# Patient Record
Sex: FEMALE | Race: WHITE | Employment: FULL TIME | RURAL
[De-identification: names, ages, dates, MRNs, and addresses within clinical notes are randomized per-mention and may not be internally consistent; named-entity substitution may affect disease eponyms.]

---

## 2017-02-27 ENCOUNTER — OFFICE VISIT (OUTPATIENT)
Dept: FAMILY MEDICINE CLINIC | Age: 57
End: 2017-02-27

## 2017-02-27 VITALS
BODY MASS INDEX: 23.92 KG/M2 | RESPIRATION RATE: 18 BRPM | DIASTOLIC BLOOD PRESSURE: 72 MMHG | HEIGHT: 63 IN | TEMPERATURE: 97.1 F | HEART RATE: 58 BPM | OXYGEN SATURATION: 99 % | WEIGHT: 135 LBS | SYSTOLIC BLOOD PRESSURE: 111 MMHG

## 2017-02-27 DIAGNOSIS — Z86.69 HISTORY OF MIGRAINE HEADACHES: Primary | ICD-10-CM

## 2017-02-27 DIAGNOSIS — K58.0 IRRITABLE BOWEL SYNDROME WITH DIARRHEA: ICD-10-CM

## 2017-02-27 RX ORDER — SUMATRIPTAN 100 MG/1
100 TABLET, FILM COATED ORAL
Qty: 18 TAB | Refills: 1 | Status: SHIPPED | OUTPATIENT
Start: 2017-02-27 | End: 2018-08-07 | Stop reason: SDUPTHER

## 2017-02-27 NOTE — PROGRESS NOTES
Opal Man is a 55 y.o. female who presents with the following:  Chief Complaint   Patient presents with    Head Pain       Head Pain    The history is provided by the patient (Patient with a long history of migraine headaches has not been doing that well with Maxalt and would like to try sumatriptan to reduce the severity of her headaches). The headache is aggravated by photophobia. Associated symptoms include malaise/fatigue and nausea. Pertinent negatives include no near-syncope, no palpitations, no syncope, no shortness of breath, no weakness, no dizziness, no visual change and no vomiting. Irritable Bowel Syndrome   The history is provided by the patient (Patient has had a prolapsed rectum and ended up with a partial bowel resection and now has up to 11-12 bowel movements a day as her every time she eats she has to run to the bathroom including anything she drinks). Associated symptoms include abdominal pain. Pertinent negatives include no chest pain, no headaches and no shortness of breath. Allergies   Allergen Reactions    Levaquin [Levofloxacin] Hives    Penicillins Hives    Percocet [Oxycodone-Acetaminophen] Nausea Only       Current Outpatient Prescriptions   Medication Sig    SUMAtriptan (IMITREX) 100 mg tablet Take 1 Tab by mouth once as needed for Migraine for up to 1 dose. Indications: MIGRAINE    rizatriptan (MAXALT) 10 mg tablet TAKE 1 TABLET BY MOUTH ONCE AS NEEDED FOR MIGRAINE, MAY REPEAT IN 2 HOURS IF NEEDED    azithromycin (ZITHROMAX) 250 mg tablet Two tabs today, then 1 tab daily for 4 more days.  aspirin 81 mg chewable tablet Take 81 mg by mouth daily.  loratadine (CLARITIN) 10 mg tablet Take 10 mg by mouth as needed for Allergies. No current facility-administered medications for this visit.         Past Medical History:   Diagnosis Date    Ill-defined condition     MIGRANES       Past Surgical History:   Procedure Laterality Date    HX COLECTOMY      Partial w/ rectopexy for prolapse    HX GI  11/20/14    COLONOSCOPY    HX GYN      UTERINE POLYPS       Family History   Problem Relation Age of Onset    Hypertension Mother     Heart Disease Mother      A FIB    Cancer Father      LYMPH, PROSTATE    Cancer Maternal Grandmother      COLON    Cancer Paternal Grandfather      PANCREATIC    Anesth Problems Neg Hx        Social History     Social History    Marital status:      Spouse name: N/A    Number of children: N/A    Years of education: N/A     Social History Main Topics    Smoking status: Never Smoker    Smokeless tobacco: Never Used      Comment: SMOKED VERY BRIEFLY IN COLLEGE    Alcohol use 1.8 oz/week     3 Standard drinks or equivalent per week    Drug use: No    Sexual activity: Not Asked     Other Topics Concern    None     Social History Narrative       Review of Systems   Constitutional: Positive for malaise/fatigue. Respiratory: Negative for shortness of breath. Cardiovascular: Negative for chest pain, palpitations, syncope and near-syncope. Gastrointestinal: Positive for abdominal pain and nausea. Negative for vomiting. Neurological: Negative for dizziness, weakness and headaches. Visit Vitals    /72 (BP 1 Location: Left arm, BP Patient Position: Sitting)    Pulse (!) 58    Temp 97.1 °F (36.2 °C) (Oral)    Resp 18    Ht 5' 2.5\" (1.588 m)    Wt 135 lb (61.2 kg)    LMP 02/13/2017 (Within Days)    SpO2 99%    BMI 24.3 kg/m2     Physical Exam   Constitutional: She is oriented to person, place, and time and well-developed, well-nourished, and in no distress. HENT:   Head: Normocephalic and atraumatic. Right Ear: External ear normal.   Left Ear: External ear normal.   Mouth/Throat: Oropharynx is clear and moist.   Eyes: Conjunctivae and EOM are normal. Pupils are equal, round, and reactive to light. Right eye exhibits no discharge. Left eye exhibits no discharge. Neck: Normal range of motion. Neck supple.  No tracheal deviation present. No thyromegaly present. Cardiovascular: Normal rate, regular rhythm, normal heart sounds and intact distal pulses. Exam reveals no gallop and no friction rub. No murmur heard. Pulmonary/Chest: Effort normal and breath sounds normal. No respiratory distress. She has no wheezes. She exhibits no tenderness. Abdominal: Soft. Bowel sounds are normal. She exhibits no distension and no mass. There is no tenderness. There is no rebound and no guarding. Musculoskeletal: She exhibits no edema or tenderness. Lymphadenopathy:     She has no cervical adenopathy. Neurological: She is alert and oriented to person, place, and time. She has normal reflexes. No cranial nerve deficit. She exhibits normal muscle tone. Gait normal. Coordination normal.   Skin: Skin is warm and dry. No rash noted. No erythema. No pallor. Psychiatric: Mood, memory, affect and judgment normal.         ICD-10-CM ICD-9-CM    1. History of migraine headaches Z86.69 V12.49    2. Irritable bowel syndrome with diarrhea K58.0 564.1        Orders Placed This Encounter    SUMAtriptan (IMITREX) 100 mg tablet     Sig: Take 1 Tab by mouth once as needed for Migraine for up to 1 dose. Indications: MIGRAINE     Dispense:  18 Tab     Refill:  1    I asked the patient to give it another 2 or 3 months as it seems to be slowly improving. If this does not work I would be willing to try Imodium a.d. and very low dose and possibly cholestyramine.     Follow-up Disposition: Not on Janice Fitzgerald MD

## 2017-02-27 NOTE — MR AVS SNAPSHOT
Visit Information Date & Time Provider Department Dept. Phone Encounter #  
 2/27/2017  4:00 PM Yady Sheth MD [facility] [phone] [id number] Upcoming Health Maintenance Date Due Hepatitis C Screening 1960 DTaP/Tdap/Td series (1 - Tdap) 5/19/1982 PAP AKA CERVICAL CYTOLOGY 5/19/1982 FOBT Q 1 YEAR AGE 50-75 5/19/2011 INFLUENZA AGE 9 TO ADULT 8/1/2016 BREAST CANCER SCRN MAMMOGRAM 4/6/2018 Allergies as of 2/27/2017  Review Complete On: 2/27/2017 By: Yady Sheth MD  
  
 Severity Noted Reaction Type Reactions Levaquin [Levofloxacin] Medium 12/01/2014   Systemic Hives Penicillins  11/21/2014    Hives Percocet [Oxycodone-acetaminophen]  11/21/2014    Nausea Only Current Immunizations  Reviewed on 12/4/2014 No immunizations on file. Not reviewed this visit You Were Diagnosed With   
  
 Codes Comments History of migraine headaches    -  Primary ICD-10-CM: Z86.69 
ICD-9-CM: V12.49 Vitals BP  
  
  
  
  
  
 111/72 (BP 1 Location: Left arm, BP Patient Position: Sitting) Vitals History BMI and BSA Data Body Mass Index Body Surface Area  
 24.3 kg/m 2 1.64 m 2 Preferred Pharmacy Pharmacy Name Phone [id number] [address] [place] [phone] Your Updated Medication List  
  
   
This list is accurate as of: 2/27/17  4:59 PM.  Always use your most recent med list.  
  
  
  
  
 aspirin 81 mg chewable tablet Take 81 mg by mouth daily. azithromycin 250 mg tablet Commonly known as:  Charlotta Primrose Two tabs today, then 1 tab daily for 4 more days. CLARITIN 10 mg tablet Generic drug:  loratadine Take 10 mg by mouth as needed for Allergies. rizatriptan 10 mg tablet Commonly known as:  Alexi Gray TAKE 1 TABLET BY MOUTH ONCE AS NEEDED FOR MIGRAINE, MAY REPEAT IN 2 HOURS IF NEEDED  
  
  
  
  
 Introducing [facility]! Dear Page: Thank you for requesting a [employer] account. Our records indicate that you already have an active [employer] account. You can access your account anytime at [email or web address] [email or web address] Did you know that you can access your hospital and ER discharge instructions at any time in [employer]? You can also review all of your test results from your hospital stay or ER visit. Additional Information If you have questions, please visit the Frequently Asked Questions section of the [employer] website at [email or web address] Remember, [employer] is NOT to be used for urgent needs. For medical emergencies, dial 911. Now available from your iPhone and Android! Please provide this summary of care documentation to your next provider. Your primary care clinician is listed as Kathrin Nagel. If you have any questions after today's visit, please call [phone].

## 2017-03-23 ENCOUNTER — OFFICE VISIT (OUTPATIENT)
Dept: FAMILY MEDICINE CLINIC | Age: 57
End: 2017-03-23

## 2017-03-23 VITALS
BODY MASS INDEX: 23.92 KG/M2 | RESPIRATION RATE: 18 BRPM | SYSTOLIC BLOOD PRESSURE: 131 MMHG | HEIGHT: 63 IN | WEIGHT: 135 LBS | HEART RATE: 66 BPM | TEMPERATURE: 95.6 F | OXYGEN SATURATION: 100 % | DIASTOLIC BLOOD PRESSURE: 81 MMHG

## 2017-03-23 DIAGNOSIS — J34.81 ULCERATIVE NASAL MUCOSITIS: Primary | ICD-10-CM

## 2017-03-23 RX ORDER — DOXYCYCLINE 100 MG/1
100 CAPSULE ORAL 2 TIMES DAILY
Qty: 20 CAP | Refills: 0 | Status: SHIPPED | OUTPATIENT
Start: 2017-03-23 | End: 2017-04-02

## 2017-03-23 RX ORDER — HYDROCORTISONE VALERATE 2 MG/G
CREAM TOPICAL 2 TIMES DAILY
Qty: 15 G | Refills: 0 | Status: SHIPPED | OUTPATIENT
Start: 2017-03-23 | End: 2017-09-29 | Stop reason: ALTCHOICE

## 2017-03-23 NOTE — MR AVS SNAPSHOT
Visit Information Date & Time Provider Department Dept. Phone Encounter #  
 3/23/2017  7:00 AM Meagan Garcia MD [facility] [phone] [id number] Upcoming Health Maintenance Date Due Hepatitis C Screening 1960 DTaP/Tdap/Td series (1 - Tdap) 5/19/1982 PAP AKA CERVICAL CYTOLOGY 5/19/1982 FOBT Q 1 YEAR AGE 50-75 5/19/2011 INFLUENZA AGE 9 TO ADULT 8/1/2016 BREAST CANCER SCRN MAMMOGRAM 4/6/2018 Allergies as of 3/23/2017  Review Complete On: 2/27/2017 By: Meagan Garcia MD  
  
 Severity Noted Reaction Type Reactions Levaquin [Levofloxacin] Medium 12/01/2014   Systemic Hives Penicillins  11/21/2014    Hives Percocet [Oxycodone-acetaminophen]  11/21/2014    Nausea Only Current Immunizations  Reviewed on 12/4/2014 No immunizations on file. Not reviewed this visit Vitals BP Pulse Temp Resp Height(growth percentile) Weight(growth percentile) 131/81 (BP 1 Location: Left arm, BP Patient Position: Sitting) 66 95.6 °F (35.3 °C) 18 5' 2.5\" (1.588 m) 135 lb (61.2 kg) LMP SpO2 BMI OB Status Smoking Status 02/13/2017 (Within Days) 100% 24.3 kg/m2 Having regular periods Never Smoker Vitals History BMI and BSA Data Body Mass Index Body Surface Area  
 24.3 kg/m 2 1.64 m 2 Preferred Pharmacy Pharmacy Name Phone [id number] [address] [phone] Your Updated Medication List  
  
   
This list is accurate as of: 3/23/17  7:24 AM.  Always use your most recent med list.  
  
  
  
  
 aspirin 81 mg chewable tablet Take 81 mg by mouth daily. azithromycin 250 mg tablet Commonly known as:  Dayton Sleight Two tabs today, then 1 tab daily for 4 more days. CLARITIN 10 mg tablet Generic drug:  loratadine Take 10 mg by mouth as needed for Allergies. rizatriptan 10 mg tablet Commonly known as:  Areli Anand TAKE 1 TABLET BY MOUTH ONCE AS NEEDED FOR MIGRAINE, MAY REPEAT IN 2 HOURS IF NEEDED  
  
 SUMAtriptan 100 mg tablet Commonly known as:  IMITREX Take 1 Tab by mouth once as needed for Migraine for up to 1 dose. Indications: MIGRAINE Introducing [facility]! Dear Page: Thank you for requesting a [employer] account. Our records indicate that you already have an active [employer] account. You can access your account anytime at [email or web address] [email or web address] Did you know that you can access your hospital and ER discharge instructions at any time in [employer]? You can also review all of your test results from your hospital stay or ER visit. Additional Information If you have questions, please visit the Frequently Asked Questions section of the [employer] website at [email or web address] Remember, [employer] is NOT to be used for urgent needs. For medical emergencies, dial 911. Now available from your iPhone and Android! Please provide this summary of care documentation to your next provider. Your primary care clinician is listed as Edgardo Leaver. If you have any questions after today's visit, please call [phone].

## 2017-03-23 NOTE — PROGRESS NOTES
Jett Issa is a 55 y.o. female who presents with the following:  Chief Complaint   Patient presents with    Nasal Laceration     sores in nose       Nasal Laceration   The history is provided by the patient (Patient with history of recurrent bilateral ulcerations at the nares with a past history of exposure to probable MRSA from infections her  received in the hospital.  Patient has noted this is been improved by doxycycline in the past.). Pertinent negatives include no chest pain, no abdominal pain, no headaches and no shortness of breath. Allergies   Allergen Reactions    Levaquin [Levofloxacin] Hives    Penicillins Hives    Percocet [Oxycodone-Acetaminophen] Nausea Only       Current Outpatient Prescriptions   Medication Sig    doxycycline (VIBRAMYCIN) 100 mg capsule Take 1 Cap by mouth two (2) times a day for 10 days.  hydrocortisone valerate (WESTCORT) 0.2 % topical cream Apply  to affected area two (2) times a day. use thin layer    SUMAtriptan (IMITREX) 100 mg tablet Take 1 Tab by mouth once as needed for Migraine for up to 1 dose. Indications: MIGRAINE    rizatriptan (MAXALT) 10 mg tablet TAKE 1 TABLET BY MOUTH ONCE AS NEEDED FOR MIGRAINE, MAY REPEAT IN 2 HOURS IF NEEDED    aspirin 81 mg chewable tablet Take 81 mg by mouth daily.  loratadine (CLARITIN) 10 mg tablet Take 10 mg by mouth as needed for Allergies.  azithromycin (ZITHROMAX) 250 mg tablet Two tabs today, then 1 tab daily for 4 more days. No current facility-administered medications for this visit.         Past Medical History:   Diagnosis Date    Ill-defined condition     MIGRANES       Past Surgical History:   Procedure Laterality Date    HX COLECTOMY      Partial w/ rectopexy for prolapse    HX GI  11/20/14    COLONOSCOPY    HX GYN      UTERINE POLYPS       Family History   Problem Relation Age of Onset    Hypertension Mother     Heart Disease Mother      A FIB    Cancer Father      LYMPH, PROSTATE    Cancer Maternal Grandmother      COLON    Cancer Paternal Grandfather      PANCREATIC    Anesth Problems Neg Hx        Social History     Social History    Marital status:      Spouse name: N/A    Number of children: N/A    Years of education: N/A     Social History Main Topics    Smoking status: Never Smoker    Smokeless tobacco: Never Used      Comment: SMOKED VERY BRIEFLY IN [place]LE[place]    Alcohol use 1.8 oz/week     3 Standard drinks or equivalent per week    Drug use: No    Sexual activity: Not on file     Other Topics Concern    Not on file     Social History Narrative    No narrative on file       Review of Systems   Respiratory: Negative for shortness of breath. Cardiovascular: Negative for chest pain. Gastrointestinal: Negative for abdominal pain. Neurological: Negative for headaches. Visit Vitals    /81 (BP 1 Location: Left arm, BP Patient Position: Sitting)    Pulse 66    Temp 95.6 °F (35.3 °C)    Resp 18    Ht 5' 2.5\" (1.588 m)    Wt 135 lb (61.2 kg)    LMP 02/13/2017 (Within Days)    SpO2 100%    BMI 24.3 kg/m2     Physical Exam   Constitutional: She is oriented to person, place, and time and well-developed, well-nourished, and in no distress. HENT:   Head: Normocephalic and atraumatic. Right Ear: External ear normal.   Left Ear: External ear normal.   Mouth/Throat: Oropharynx is clear and moist.   At the nasal introitus on either side there is an ulcerated area surrounded by erythema which is quite tender and could be infected with either staph or strep   Eyes: Conjunctivae and EOM are normal. Pupils are equal, round, and reactive to light. Right eye exhibits no discharge. Left eye exhibits no discharge. Neck: Normal range of motion. Neck supple. No tracheal deviation present. No thyromegaly present. Cardiovascular: Normal rate, regular rhythm, normal heart sounds and intact distal pulses. Exam reveals no gallop and no friction rub.     No murmur heard.  Pulmonary/Chest: Effort normal and breath sounds normal. No respiratory distress. She has no wheezes. She exhibits no tenderness. Abdominal: Soft. Bowel sounds are normal. She exhibits no distension and no mass. There is no tenderness. There is no rebound and no guarding. Musculoskeletal: She exhibits no edema or tenderness. Lymphadenopathy:     She has no cervical adenopathy. Neurological: She is alert and oriented to person, place, and time. She has normal reflexes. No cranial nerve deficit. She exhibits normal muscle tone. Gait normal. Coordination normal. GCS score is 15. Skin: Skin is warm and dry. No rash noted. No erythema. No pallor. Psychiatric: Mood, memory, affect and judgment normal.         ICD-10-CM ICD-9-CM    1. Ulcerative nasal mucositis J34.81 478.11 doxycycline (VIBRAMYCIN) 100 mg capsule      hydrocortisone valerate (WESTCORT) 0.2 % topical cream     The patient is instructed on proper application of the hydrocortisone to relieve some of her stress and this condition can be recurrent and aggravated by very cold dry air which we have had lately. Patient has been instructed how to prevent cross contamination of her medications with good handwashing before application and avoiding touching the tubes of creams to obtain a bit of medicine. Orders Placed This Encounter    doxycycline (VIBRAMYCIN) 100 mg capsule     Sig: Take 1 Cap by mouth two (2) times a day for 10 days. Dispense:  20 Cap     Refill:  0    hydrocortisone valerate (WESTCORT) 0.2 % topical cream     Sig: Apply  to affected area two (2) times a day.  use thin layer     Dispense:  15 g     Refill:  0       Follow-up Disposition: Not on Savannah Stevens MD

## 2017-08-01 ENCOUNTER — OFFICE VISIT (OUTPATIENT)
Dept: FAMILY MEDICINE CLINIC | Age: 57
End: 2017-08-01

## 2017-08-01 VITALS
HEIGHT: 63 IN | WEIGHT: 130.6 LBS | HEART RATE: 66 BPM | OXYGEN SATURATION: 98 % | BODY MASS INDEX: 23.14 KG/M2 | SYSTOLIC BLOOD PRESSURE: 137 MMHG | TEMPERATURE: 96.1 F | DIASTOLIC BLOOD PRESSURE: 86 MMHG | RESPIRATION RATE: 17 BRPM

## 2017-08-01 DIAGNOSIS — H69.83 ETD (EUSTACHIAN TUBE DYSFUNCTION), BILATERAL: ICD-10-CM

## 2017-08-01 DIAGNOSIS — J31.0 NONALLERGIC RHINITIS: Primary | ICD-10-CM

## 2017-08-01 RX ORDER — PSEUDOEPHEDRINE HCL 120 MG/1
120 TABLET, FILM COATED, EXTENDED RELEASE ORAL
Qty: 20 TAB | Refills: 1 | Status: SHIPPED | OUTPATIENT
Start: 2017-08-01 | End: 2017-09-29 | Stop reason: ALTCHOICE

## 2017-08-01 NOTE — PROGRESS NOTES
Refugio Hurtado is a 65 y.o. female who presents with the following:  Chief Complaint   Patient presents with    Ear Pain       Ear Pain   The history is provided by the patient (Patient with bilateral ear pressure and eustachian tube dysfunction who has an air flight coming up and is somewhat concerned. ). Pertinent negatives include no chest pain, no abdominal pain, no headaches and no shortness of breath. Allergies   Allergen Reactions    Levaquin [Levofloxacin] Hives    Penicillins Hives    Percocet [Oxycodone-Acetaminophen] Nausea Only       Current Outpatient Prescriptions   Medication Sig    pseudoephedrine CR (SUDAFED 12 HOUR) 120 mg CR tablet Take 1 Tab by mouth two (2) times daily as needed for Congestion.  SUMAtriptan (IMITREX) 100 mg tablet Take 1 Tab by mouth once as needed for Migraine for up to 1 dose. Indications: MIGRAINE    aspirin 81 mg chewable tablet Take 81 mg by mouth daily.  loratadine (CLARITIN) 10 mg tablet Take 10 mg by mouth as needed for Allergies.  hydrocortisone valerate (WESTCORT) 0.2 % topical cream Apply  to affected area two (2) times a day. use thin layer    rizatriptan (MAXALT) 10 mg tablet TAKE 1 TABLET BY MOUTH ONCE AS NEEDED FOR MIGRAINE, MAY REPEAT IN 2 HOURS IF NEEDED    azithromycin (ZITHROMAX) 250 mg tablet Two tabs today, then 1 tab daily for 4 more days. No current facility-administered medications for this visit.         Past Medical History:   Diagnosis Date    Ill-defined condition     MIGRANES       Past Surgical History:   Procedure Laterality Date    HX COLECTOMY      Partial w/ rectopexy for prolapse    HX GI  11/20/14    COLONOSCOPY    HX GYN      UTERINE POLYPS       Family History   Problem Relation Age of Onset    Hypertension Mother     Heart Disease Mother      A FIB    Cancer Father      LYMPH, PROSTATE    Cancer Maternal Grandmother      COLON    Cancer Paternal Grandfather      PANCREATIC    Anesth Problems Neg Hx Social History     Social History    Marital status:      Spouse name: N/A    Number of children: N/A    Years of education: N/A     Social History Main Topics    Smoking status: Never Smoker    Smokeless tobacco: Never Used      Comment: SMOKED VERY BRIEFLY IN [place]    Alcohol use 1.8 oz/week     3 Standard drinks or equivalent per week    Drug use: No    Sexual activity: Not Asked     Other Topics Concern    None     Social History Narrative       Review of Systems   HENT: Positive for congestion. Respiratory: Negative for shortness of breath. Cardiovascular: Negative for chest pain. Gastrointestinal: Negative for abdominal pain. Neurological: Negative for headaches. Visit Vitals    /86 (BP 1 Location: Left arm, BP Patient Position: Sitting)    Pulse 66    Temp 96.1 °F (35.6 °C) (Oral)    Resp 17    Ht 5' 2.5\" (1.588 m)    Wt 130 lb 9.6 oz (59.2 kg)    SpO2 98%    BMI 23.51 kg/m2     Physical Exam   Constitutional: She is oriented to person, place, and time and well-developed, well-nourished, and in no distress. Has coryza that is clear - mild distress   HENT:   Head: Normocephalic and atraumatic. Right Ear: External ear normal.   Left Ear: External ear normal.   Mouth/Throat: Oropharynx is clear and moist. No oropharyngeal exudate. Mucous membranes in the nose and throat are normal however the TMs are slightly retracted however she is able to Valsalva them clear. Eyes: Conjunctivae and EOM are normal. Pupils are equal, round, and reactive to light. Right eye exhibits no discharge. Left eye exhibits no discharge. Neck: Normal range of motion. Neck supple. No tracheal deviation present. No thyromegaly present. Cardiovascular: Normal rate, regular rhythm, normal heart sounds and intact distal pulses. Exam reveals no gallop and no friction rub. No murmur heard. Pulmonary/Chest: Effort normal and breath sounds normal. No stridor.  No respiratory distress. She has no wheezes. She has no rales. She exhibits no tenderness. Abdominal: Soft. Bowel sounds are normal. She exhibits no distension and no mass. There is no tenderness. There is no rebound and no guarding. Musculoskeletal: She exhibits no edema or tenderness. Lymphadenopathy:     She has no cervical adenopathy. Neurological: She is alert and oriented to person, place, and time. She has normal reflexes. No cranial nerve deficit. She exhibits normal muscle tone. Gait normal. Coordination normal.   Skin: Skin is warm and dry. No rash noted. She is not diaphoretic. No erythema. No pallor. Psychiatric: Mood, memory, affect and judgment normal.         ICD-10-CM ICD-9-CM    1. Nonallergic rhinitis J31.0 472.0    2. ETD (eustachian tube dysfunction), bilateral H69.83 381.81        Orders Placed This Encounter    pseudoephedrine CR (SUDAFED 12 HOUR) 120 mg CR tablet     Sig: Take 1 Tab by mouth two (2) times daily as needed for Congestion. Dispense:  20 Tab     Refill:  1   Patient was told she could use Afrin as a backup should the pseudoephedrine not be good enough and I told her to start to Valsalva frequently when she arrives at the airport to catch her flight and then she should Valsalva while descending in the aircraft.     Follow-up Disposition: Not on Mark Salazar MD

## 2017-08-01 NOTE — MR AVS SNAPSHOT
Visit Information Date & Time Provider Department Dept. Phone Encounter #  
 8/1/2017  9:40 AM Kevin Castro MD [facility] [phone] [id number] Your Appointments 9/20/2017  8:00 AM  
COMPLETE PHYSICAL with Kevin Castro MD  
[facility]) Appt Note: CPE/ 6/28/17[facility]0 [facility] [phone]  
  
   
 [address] [place] [address] [phone] Upcoming Health Maintenance Date Due Hepatitis C Screening 1960 DTaP/Tdap/Td series (1 - Tdap) 5/19/1982 PAP AKA CERVICAL CYTOLOGY 5/19/1982 FOBT Q 1 YEAR AGE 50-75 5/19/2011 INFLUENZA AGE 9 TO ADULT 8/1/2017 BREAST CANCER SCRN MAMMOGRAM 4/27/2019 Allergies as of 8/1/2017  Review Complete On: 8/1/2017 By: Kevin Castro MD  
  
 Severity Noted Reaction Type Reactions Levaquin [Levofloxacin] Medium 12/01/2014   Systemic Hives Penicillins  11/21/2014    Hives Percocet [Oxycodone-acetaminophen]  11/21/2014    Nausea Only Current Immunizations  Reviewed on 12/4/2014 No immunizations on file. Not reviewed this visit Vitals BP Pulse Temp Resp Height(growth percentile) Weight(growth percentile) 137/86 (BP 1 Location: Left arm, BP Patient Position: Sitting) 66 96.1 °F (35.6 °C) (Oral) 17 5' 2.5\" (1.588 m) 130 lb 9.6 oz (59.2 kg) SpO2 BMI OB Status Smoking Status 98% 23.51 kg/m2 Menopause Never Smoker Vitals History BMI and BSA Data Body Mass Index Body Surface Area  
 23.51 kg/m 2 1.62 m 2 Preferred Pharmacy Pharmacy Name Phone [id number] [address] [address] [place] [phone] Your Updated Medication List  
  
   
This list is accurate as of: 8/1/17 11:12 AM.  Always use your most recent med list.  
  
  
  
  
 aspirin 81 mg chewable tablet Take 81 mg by mouth daily. azithromycin 250 mg tablet Commonly known as:  Milli [place] Two tabs today, then 1 tab daily for 4 more days. CLARITIN 10 mg tablet Generic drug:  loratadine Take 10 mg by mouth as needed for Allergies. hydrocortisone valerate 0.2 % topical cream  
Commonly known as:  [employer] Apply  to affected area two (2) times a day. use thin layer  
  
 rizatriptan 10 mg tablet Commonly known as:  Leeroy Aus TAKE 1 TABLET BY MOUTH ONCE AS NEEDED FOR MIGRAINE, MAY REPEAT IN 2 HOURS IF NEEDED  
  
 SUMAtriptan 100 mg tablet Commonly known as:  IMITREX Take 1 Tab by mouth once as needed for Migraine for up to 1 dose. Indications: MIGRAINE Introducing [facility] & HEALTH SERVICES! Dear Page: Thank you for requesting a [employer] account. Our records indicate that you already have an active [employer] account. You can access your account anytime at [email or web address] Did you know that you can access your hospital and ER discharge instructions at any time in [employer]? You can also review all of your test results from your hospital stay or ER visit. Additional Information If you have questions, please visit the Frequently Asked Questions section of the [employer] website at [email or web address] Remember, [employer] is NOT to be used for urgent needs. For medical emergencies, dial 911. Now available from your iPhone and Android! Please provide this summary of care documentation to your next provider. Your primary care clinician is listed as Denisha Cuba. If you have any questions after today's visit, please call [phone].

## 2017-09-29 ENCOUNTER — OFFICE VISIT (OUTPATIENT)
Dept: FAMILY MEDICINE CLINIC | Age: 57
End: 2017-09-29

## 2017-09-29 VITALS
SYSTOLIC BLOOD PRESSURE: 128 MMHG | BODY MASS INDEX: 22.86 KG/M2 | HEART RATE: 57 BPM | RESPIRATION RATE: 18 BRPM | DIASTOLIC BLOOD PRESSURE: 85 MMHG | WEIGHT: 129 LBS | TEMPERATURE: 96.8 F | HEIGHT: 63 IN | OXYGEN SATURATION: 100 %

## 2017-09-29 DIAGNOSIS — R53.82 CHRONIC FATIGUE: ICD-10-CM

## 2017-09-29 DIAGNOSIS — E01.0 THYROMEGALY: ICD-10-CM

## 2017-09-29 DIAGNOSIS — Z00.00 ROUTINE PHYSICAL EXAMINATION: Primary | ICD-10-CM

## 2017-09-29 DIAGNOSIS — Z79.899 ENCOUNTER FOR LONG-TERM (CURRENT) USE OF MEDICATIONS: ICD-10-CM

## 2017-09-29 DIAGNOSIS — Z13.220 LIPID SCREENING: ICD-10-CM

## 2017-09-29 NOTE — MR AVS SNAPSHOT
Visit Information Date & Time Provider Department Dept. Phone Encounter #  
 9/29/2017  8:00 AM Venancio Sotomayor MD [facility] [phone] [id number] Upcoming Health Maintenance Date Due Hepatitis C Screening 1960 DTaP/Tdap/Td series (1 - Tdap) 5/19/1982 PAP AKA CERVICAL CYTOLOGY 5/19/1982 FOBT Q 1 YEAR AGE 50-75 5/19/2011 INFLUENZA AGE 9 TO ADULT 8/1/2017 BREAST CANCER SCRN MAMMOGRAM 4/27/2019 Allergies as of 9/29/2017  Review Complete On: 9/29/2017 By: Venancio Sotomayor MD  
  
 Severity Noted Reaction Type Reactions Levaquin [Levofloxacin] Medium 12/01/2014   Systemic Hives Penicillins  11/21/2014    Hives Percocet [Oxycodone-acetaminophen]  11/21/2014    Nausea Only Current Immunizations  Reviewed on 12/4/2014 No immunizations on file. Not reviewed this visit You Were Diagnosed With   
  
 Codes Comments Routine physical examination    -  Primary ICD-10-CM: Z00.00 ICD-9-CM: V70.0 Chronic fatigue     ICD-10-CM: R53.82 
ICD-9-CM: 780.79 Thyromegaly     ICD-10-CM: E01.0 ICD-9-CM: 240.9 Lipid screening     ICD-10-CM: C26.197 ICD-9-CM: V77.91 Encounter for long-term (current) use of medications     ICD-10-CM: Z79.899 ICD-9-CM: V58.69 Vitals BP Pulse Temp Resp Height(growth percentile) Weight(growth percentile) 128/85 (BP 1 Location: Left arm) (!) 57 96.8 °F (36 °C) 18 5' 2.5\" (1.588 m) 129 lb (58.5 kg) SpO2 BMI OB Status Smoking Status 100% 23.22 kg/m2 Menopause Never Smoker Vitals History BMI and BSA Data Body Mass Index Body Surface Area  
 23.22 kg/m 2 1.61 m 2 Preferred Pharmacy Pharmacy Name Phone [id number] [place] [facility] [place] [phone] Your Updated Medication List  
  
   
This list is accurate as of: 9/29/17  9:14 AM.  Always use your most recent med list.  
  
  
  
  
 aspirin 81 mg chewable tablet Take 81 mg by mouth daily. CLARITIN 10 mg tablet Generic drug:  loratadine Take 10 mg by mouth as needed for Allergies. SUMAtriptan 100 mg tablet Commonly known as:  IMITREX Take 1 Tab by mouth once as needed for Migraine for up to 1 dose. Indications: MIGRAINE We Performed the Following LIPID PANEL [46420 CPT(R)] METABOLIC PANEL, COMPREHENSIVE [31652 CPT(R)] THYROID PANEL W/TSH [87788 CPT(R)] Introducing [facility] [place] [facility] SERVICES! Dear Page: Thank you for requesting a [employer] account. Our records indicate that you already have an active [employer] account. You can access your account anytime at [email or web address] Did you know that you can access your hospital and ER discharge instructions at any time in [employer]? You can also review all of your test results from your hospital stay or ER visit. Additional Information If you have questions, please visit the Frequently Asked Questions section of the [employer] website at [email or web address] Remember, [employer] is NOT to be used for urgent needs. For medical emergencies, dial 911. Now available from your iPhone and Android! Please provide this summary of care documentation to your next provider. Your primary care clinician is listed as Percy Dos Santos. If you have any questions after today's visit, please call [phone].

## 2017-09-29 NOTE — PROGRESS NOTES
Giovany Quarles is a 65 y.o. female who presents with the following:  Chief Complaint   Patient presents with    Physical    Headache     HX [address]       Physical   The history is provided by the patient (Patient with history of migraines is in for general medical examination). Associated symptoms include headaches. Pertinent negatives include no chest pain, no abdominal pain and no shortness of breath. Headache   Associated symptoms include headaches. Pertinent negatives include no chest pain, no abdominal pain and no shortness of breath. Allergies   Allergen Reactions    Levaquin [Levofloxacin] Hives    Penicillins Hives    Percocet [Oxycodone-Acetaminophen] Nausea Only       Current Outpatient Prescriptions   Medication Sig    SUMAtriptan (IMITREX) 100 mg tablet Take 1 Tab by mouth once as needed for Migraine for up to 1 dose. Indications: MIGRAINE    aspirin 81 mg chewable tablet Take 81 mg by mouth daily.  loratadine (CLARITIN) 10 mg tablet Take 10 mg by mouth as needed for Allergies. No current facility-administered medications for this visit.         Past Medical History:   Diagnosis Date    Ill-defined condition     MIGRANES       Past Surgical History:   Procedure Laterality Date    HX COLECTOMY      Partial w/ rectopexy for prolapse    HX GI  11/20/14    COLONOSCOPY    HX GYN      UTERINE POLYPS       Family History   Problem Relation Age of Onset    Hypertension Mother     Heart Disease Mother      A FIB    Cancer Father      LYMPH, PROSTATE    Cancer Maternal Grandmother      COLON    Cancer Paternal Grandfather      PANCREATIC    Anesth Problems Neg Hx        Social History     Social History    Marital status:      Spouse name: N/A    Number of children: N/A    Years of education: N/A     Social History Main Topics    Smoking status: Never Smoker    Smokeless tobacco: Never Used      Comment: SMOKED VERY BRIEFLY IN COLLEGE    Alcohol use 1.8 oz/week     3 Standard drinks or equivalent per week    Drug use: No    Sexual activity: Not on file     Other Topics Concern    Not on file     Social History Narrative       Review of Systems   Constitutional: Positive for malaise/fatigue. Negative for chills, fever and weight loss. HENT: Negative for congestion, hearing loss, sore throat and tinnitus. Eyes: Negative for blurred vision, pain and discharge. Respiratory: Negative for cough, shortness of breath and wheezing. Cardiovascular: Negative for chest pain, palpitations, orthopnea, claudication and leg swelling. Gastrointestinal: Negative for abdominal pain, constipation and heartburn. Genitourinary: Negative for dysuria, frequency and urgency. Musculoskeletal: Negative for falls, joint pain and myalgias. Skin: Negative for itching and rash. Neurological: Positive for headaches. Negative for dizziness, tingling and tremors. Endo/Heme/Allergies: Negative for environmental allergies and polydipsia. Psychiatric/Behavioral: Negative for depression and substance abuse. The patient is not nervous/anxious and does not have insomnia. Visit Vitals    /85 (BP 1 Location: Left arm)    Pulse (!) 57    Temp 96.8 °F (36 °C)    Resp 18    Ht 5' 2.5\" (1.588 m)    Wt 129 lb (58.5 kg)    SpO2 100%    BMI 23.22 kg/m2     Physical Exam   Constitutional: She is oriented to person, place, and time and well-developed, well-nourished, and in no distress. HENT:   Head: Normocephalic and atraumatic. Right Ear: External ear normal.   Left Ear: External ear normal.   Mouth/Throat: Oropharynx is clear and moist.   Eyes: Conjunctivae and EOM are normal. Pupils are equal, round, and reactive to light. Right eye exhibits no discharge. Left eye exhibits no discharge. Neck: Normal range of motion. Neck supple. No tracheal deviation present. Thyromegaly present. Cardiovascular: Normal rate, regular rhythm, normal heart sounds and intact distal pulses. Exam reveals no gallop and no friction rub. No murmur heard. Pulmonary/Chest: Effort normal and breath sounds normal. No respiratory distress. She has no wheezes. She exhibits no tenderness. Abdominal: Soft. Bowel sounds are normal. She exhibits no distension and no mass. There is no tenderness. There is no rebound and no guarding. Musculoskeletal: She exhibits no edema or tenderness. Lymphadenopathy:     She has no cervical adenopathy. Neurological: She is alert and oriented to person, place, and time. She has normal reflexes. No cranial nerve deficit. She exhibits normal muscle tone. Gait normal. Coordination normal.   Skin: Skin is warm and dry. No rash noted. No erythema. No pallor. Psychiatric: Mood, memory, affect and judgment normal.         ICD-10-CM ICD-9-CM    1. Routine physical examination Z00.00 V70.0    2. Chronic fatigue R53.82 780.79 THYROID PANEL W/TSH      METABOLIC PANEL, COMPREHENSIVE   3. Thyromegaly E01.0 240.9 THYROID PANEL W/TSH   4. Lipid screening Z13.220 V77.91 LIPID PANEL   5.  Encounter for long-term (current) use of medications J45.425 Y60.30 METABOLIC PANEL, COMPREHENSIVE       Orders Placed This Encounter    THYROID PANEL W/TSH    METABOLIC PANEL, COMPREHENSIVE    LIPID PANEL       Follow-up Disposition: Not on Alejandro Mauro MD

## 2017-09-30 LAB
ALBUMIN SERPL-MCNC: 4.3 G/DL (ref 3.5–5.5)
ALBUMIN/GLOB SERPL: 1.7 {RATIO} (ref 1.2–2.2)
ALP SERPL-CCNC: 68 IU/L (ref 39–117)
ALT SERPL-CCNC: 43 IU/L (ref 0–32)
AST SERPL-CCNC: 42 IU/L (ref 0–40)
BILIRUB SERPL-MCNC: 0.4 MG/DL (ref 0–1.2)
BUN SERPL-MCNC: 15 MG/DL (ref 6–24)
BUN/CREAT SERPL: 25 (ref 9–23)
CALCIUM SERPL-MCNC: 9.3 MG/DL (ref 8.7–10.2)
CHLORIDE SERPL-SCNC: 102 MMOL/L (ref 96–106)
CHOLEST SERPL-MCNC: 193 MG/DL (ref 100–199)
CO2 SERPL-SCNC: 26 MMOL/L (ref 18–29)
CREAT SERPL-MCNC: 0.61 MG/DL (ref 0.57–1)
FT4I SERPL CALC-MCNC: 1.7 (ref 1.2–4.9)
GLOBULIN SER CALC-MCNC: 2.6 G/DL (ref 1.5–4.5)
GLUCOSE SERPL-MCNC: 84 MG/DL (ref 65–99)
HDLC SERPL-MCNC: 66 MG/DL
INTERPRETATION, 910389: NORMAL
LDLC SERPL CALC-MCNC: 117 MG/DL (ref 0–99)
POTASSIUM SERPL-SCNC: 4.8 MMOL/L (ref 3.5–5.2)
PROT SERPL-MCNC: 6.9 G/DL (ref 6–8.5)
SODIUM SERPL-SCNC: 142 MMOL/L (ref 134–144)
T3RU NFR SERPL: 29 % (ref 24–39)
T4 SERPL-MCNC: 6 UG/DL (ref 4.5–12)
TRIGL SERPL-MCNC: 48 MG/DL (ref 0–149)
TSH SERPL DL<=0.005 MIU/L-ACNC: 1.24 UIU/ML (ref 0.45–4.5)
VLDLC SERPL CALC-MCNC: 10 MG/DL (ref 5–40)

## 2018-03-09 ENCOUNTER — OFFICE VISIT (OUTPATIENT)
Dept: FAMILY MEDICINE CLINIC | Age: 58
End: 2018-03-09

## 2018-03-09 VITALS
TEMPERATURE: 98.1 F | HEART RATE: 55 BPM | WEIGHT: 133.6 LBS | RESPIRATION RATE: 18 BRPM | BODY MASS INDEX: 23.67 KG/M2 | HEIGHT: 63 IN | SYSTOLIC BLOOD PRESSURE: 130 MMHG | OXYGEN SATURATION: 98 % | DIASTOLIC BLOOD PRESSURE: 84 MMHG

## 2018-03-09 DIAGNOSIS — J00 ACUTE NASOPHARYNGITIS: ICD-10-CM

## 2018-03-09 DIAGNOSIS — H92.03 OTALGIA OF BOTH EARS: ICD-10-CM

## 2018-03-09 DIAGNOSIS — J02.9 SORE THROAT: Primary | ICD-10-CM

## 2018-03-09 LAB
S PYO AG THROAT QL: NEGATIVE
VALID INTERNAL CONTROL?: YES

## 2018-03-09 RX ORDER — BISMUTH SUBSALICYLATE 262 MG
1 TABLET,CHEWABLE ORAL DAILY
COMMUNITY

## 2018-03-09 RX ORDER — CETIRIZINE HYDROCHLORIDE, PSEUDOEPHEDRINE HYDROCHLORIDE 5; 120 MG/1; MG/1
1 TABLET, FILM COATED, EXTENDED RELEASE ORAL 2 TIMES DAILY
Qty: 60 TAB | Refills: 5
Start: 2018-03-09 | End: 2019-09-10 | Stop reason: ALTCHOICE

## 2018-03-09 RX ORDER — MUPIROCIN 20 MG/G
OINTMENT TOPICAL
Refills: 6 | COMMUNITY
Start: 2018-02-07 | End: 2019-09-10 | Stop reason: ALTCHOICE

## 2018-03-09 NOTE — MR AVS SNAPSHOT
[address] [phone] Patient: Dimple Stone MRN: [id number] True Anh Visit Information Date & Time Provider Department Dept. Phone Encounter #  
 3/9/2018  3:40 PM Rose Parkinson  [address] [facility] [address] [place] [phone] [id number] Upcoming Health Maintenance Date Due Hepatitis C Screening 1960 DTaP/Tdap/Td series (1 - Tdap) 5/19/1982 PAP AKA CERVICAL CYTOLOGY 5/19/1982 FOBT Q 1 YEAR AGE 50-75 5/19/2011 BREAST CANCER SCRN MAMMOGRAM 4/27/2019 Allergies as of 3/9/2018  Review Complete On: 3/9/2018 By: Hayes Figueroa LPN Severity Noted Reaction Type Reactions Levaquin [Levofloxacin] Medium 12/01/2014   Systemic Hives Penicillins  11/21/2014    Hives Percocet [Oxycodone-acetaminophen]  11/21/2014    Nausea Only Current Immunizations  Reviewed on 12/4/2014 No immunizations on file. Not reviewed this visit You Were Diagnosed With   
  
 Codes Comments Sore throat    -  Primary ICD-10-CM: J02.9 ICD-9-CM: 492 Vitals BP Pulse Temp Resp Height(growth percentile) Weight(growth percentile) 130/84 (BP 1 Location: Left arm, BP Patient Position: Sitting) (!) 55 98.1 °F (36.7 °C) (Oral) 18 5' 2.5\" (1.588 m) 133 lb 9.6 oz (60.6 kg) SpO2 BMI OB Status Smoking Status 98% 24.05 kg/m2 Menopause Never Smoker Vitals History BMI and BSA Data Body Mass Index Body Surface Area 24.05 kg/m 2 1.63 m 2 Preferred Pharmacy [facility] Name Phone [id number] [address] [phone] Your Updated Medication List  
  
   
This list is accurate as of 3/9/18  4:08 PM.  Always use your most recent med list.  
  
  
  
  
 aspirin 81 mg chewable tablet Take 81 mg by mouth daily. CLARITIN 10 mg tablet Generic drug:  loratadine Take 10 mg by mouth as needed for Allergies. FISH -160-1,000 mg Cap Generic drug:  omega 3-dha-epa-fish oil Take  by mouth. MAGNESIUM PO Take  by mouth.  
  
 multivitamin tablet Commonly known as:  ONE A DAY Take 1 Tab by mouth daily. mupirocin 2 % ointment Commonly known as:  BACTROBAN  
APPLY EXTERNALLY AS DIRECTED BY MD  
  
 SUMAtriptan 100 mg tablet Commonly known as:  IMITREX Take 1 Tab by mouth once as needed for Migraine for up to 1 dose. Indications: MIGRAINE We Performed the Following AMB POC RAPID STREP A [21162 CPT(R)] Introducing [facility] SERVICES! Dear Page: Thank you for requesting a [employer] account. Our records indicate that you already have an active [employer] account. You can access your account anytime at [email or web address] Did you know that you can access your hospital and ER discharge instructions at any time in [employer]? You can also review all of your test results from your hospital stay or ER visit. Additional Information If you have questions, please visit the Frequently Asked Questions section of the [employer] website at [email or web address] Remember, [employer] is NOT to be used for urgent needs. For medical emergencies, dial 911. Now available from your iPhone and Android! Please provide this summary of care documentation to your next provider. Your primary care clinician is listed as Ladell Setting. If you have any questions after today's visit, please call [phone].

## 2018-03-09 NOTE — PROGRESS NOTES
Eva Alvarado is a 65 y.o. female who presents with the following:  Chief Complaint   Patient presents with    Ear Pain       Ear Pain   The history is provided by the patient (Patient's had one week of her ears aching worse on the left than the right and a feeling like they may be blocked up and she has had a slightly sore throat. ). Pertinent negatives include no chest pain, no abdominal pain, no headaches and no shortness of breath. Allergies   Allergen Reactions    Levaquin [Levofloxacin] Hives    Penicillins Hives    Percocet [Oxycodone-Acetaminophen] Nausea Only       Current Outpatient Prescriptions   Medication Sig    mupirocin (BACTROBAN) 2 % ointment APPLY EXTERNALLY AS DIRECTED BY MD    multivitamin (ONE A DAY) tablet Take 1 Tab by mouth daily.  omega 3-dha-epa-fish oil (FISH OIL) 100-160-1,000 mg cap Take  by mouth.  MAGNESIUM PO Take  by mouth.  cetirizine-psuedoePHEDrine (ZYRTEC-D) 5-120 mg per tablet Take 1 Tab by mouth two (2) times a day.  SUMAtriptan (IMITREX) 100 mg tablet Take 1 Tab by mouth once as needed for Migraine for up to 1 dose. Indications: MIGRAINE    aspirin 81 mg chewable tablet Take 81 mg by mouth daily.  loratadine (CLARITIN) 10 mg tablet Take 10 mg by mouth as needed for Allergies. No current facility-administered medications for this visit.         Past Medical History:   Diagnosis Date    Ill-defined condition     MIGRANES       Past Surgical History:   Procedure Laterality Date    HX COLECTOMY      Partial w/ rectopexy for prolapse    HX GI  11/20/14    COLONOSCOPY    HX GYN      UTERINE POLYPS       Family History   Problem Relation Age of Onset    Hypertension Mother     Heart Disease Mother      A FIB    Cancer Father      LYMPH, PROSTATE    Cancer Maternal Grandmother      COLON    Cancer Paternal Grandfather      PANCREATIC    Anesth Problems Neg Hx        Social History     Social History    Marital status:      Spouse name: N/A    Number of children: N/A    Years of education: N/A     Social History Main Topics    Smoking status: Never Smoker    Smokeless tobacco: Never Used      Comment: SMOKED VERY BRIEFLY IN COLLEGE    Alcohol use 1.8 oz/week     3 Standard drinks or equivalent per week    Drug use: No    Sexual activity: Not Asked     Other Topics Concern    None     Social History Narrative       Review of Systems   HENT: Positive for congestion, ear pain and sore throat. Respiratory: Negative for shortness of breath. Cardiovascular: Negative for chest pain. Gastrointestinal: Negative for abdominal pain. Neurological: Negative for headaches. Visit Vitals    /84 (BP 1 Location: Left arm, BP Patient Position: Sitting)    Pulse (!) 55    Temp 98.1 °F (36.7 °C) (Oral)    Resp 18    Ht 5' 2.5\" (1.588 m)    Wt 133 lb 9.6 oz (60.6 kg)    SpO2 98%    BMI 24.05 kg/m2     Physical Exam   Constitutional: She is oriented to person, place, and time. No distress. HENT:   Head: Normocephalic and atraumatic. Right Ear: External ear normal.   Left Ear: External ear normal.   Mouth/Throat: No oropharyngeal exudate. Red mm's in the nose and tht   Eyes: Conjunctivae and EOM are normal. Pupils are equal, round, and reactive to light. Right eye exhibits no discharge. Left eye exhibits no discharge. Neck: Normal range of motion. Neck supple. No tracheal deviation present. No thyromegaly present. Cardiovascular: Normal rate, regular rhythm, normal heart sounds and intact distal pulses. Exam reveals no gallop and no friction rub. No murmur heard. Pulmonary/Chest: Effort normal and breath sounds normal. No stridor. No respiratory distress. She has no wheezes. She has no rales. She exhibits no tenderness. Abdominal: She exhibits no distension and no mass. There is no tenderness. There is no guarding. Musculoskeletal: She exhibits no edema or tenderness.    Lymphadenopathy:     She has no cervical adenopathy. Neurological: She is alert and oriented to person, place, and time. She has normal reflexes. Gait normal.   Skin: Skin is warm and dry. No rash noted. She is not diaphoretic. No erythema. Psychiatric: Mood, memory, affect and judgment normal.         ICD-10-CM ICD-9-CM    1. Sore throat J02.9 462 mupirocin (BACTROBAN) 2 % ointment      multivitamin (ONE A DAY) tablet      omega 3-dha-epa-fish oil (FISH OIL) 100-160-1,000 mg cap      MAGNESIUM PO      AMB POC RAPID STREP A      cetirizine-psuedoePHEDrine (ZYRTEC-D) 5-120 mg per tablet   2. Otalgia of both ears H92.03 388.70 mupirocin (BACTROBAN) 2 % ointment      multivitamin (ONE A DAY) tablet      omega 3-dha-epa-fish oil (FISH OIL) 100-160-1,000 mg cap      MAGNESIUM PO      AMB POC RAPID STREP A      cetirizine-psuedoePHEDrine (ZYRTEC-D) 5-120 mg per tablet   3. Acute nasopharyngitis J00 460        Orders Placed This Encounter    AMB POC RAPID STREP A    mupirocin (BACTROBAN) 2 % ointment     Sig: APPLY EXTERNALLY AS DIRECTED BY MD     Refill:  6    multivitamin (ONE A DAY) tablet     Sig: Take 1 Tab by mouth daily.  omega 3-dha-epa-fish oil (FISH OIL) 100-160-1,000 mg cap     Sig: Take  by mouth.  MAGNESIUM PO     Sig: Take  by mouth.  cetirizine-psuedoePHEDrine (ZYRTEC-D) 5-120 mg per tablet     Sig: Take 1 Tab by mouth two (2) times a day. Dispense:  60 Tab     Refill:  5   Patient is asked to Valsalva hourly to keep her ears clear.         Follow-up Disposition: Not on Williams Finney MD

## 2018-08-07 ENCOUNTER — OFFICE VISIT (OUTPATIENT)
Dept: FAMILY MEDICINE CLINIC | Age: 58
End: 2018-08-07

## 2018-08-07 VITALS
TEMPERATURE: 97.3 F | HEART RATE: 64 BPM | WEIGHT: 132 LBS | RESPIRATION RATE: 18 BRPM | DIASTOLIC BLOOD PRESSURE: 87 MMHG | HEIGHT: 62 IN | BODY MASS INDEX: 24.29 KG/M2 | OXYGEN SATURATION: 99 % | SYSTOLIC BLOOD PRESSURE: 130 MMHG

## 2018-08-07 DIAGNOSIS — Z86.69 HISTORY OF MIGRAINE HEADACHES: Primary | ICD-10-CM

## 2018-08-07 RX ORDER — PROMETHAZINE HYDROCHLORIDE 25 MG/1
25 TABLET ORAL
Qty: 30 TAB | Refills: 1 | Status: SHIPPED | OUTPATIENT
Start: 2018-08-07

## 2018-08-07 RX ORDER — SUMATRIPTAN 100 MG/1
100 TABLET, FILM COATED ORAL
Qty: 18 TAB | Refills: 1 | Status: SHIPPED | OUTPATIENT
Start: 2018-08-07 | End: 2018-08-07

## 2018-08-07 NOTE — PROGRESS NOTES
1. Have you been to the ER, urgent care clinic since your last visit? Hospitalized since your last visit?no    2. Have you seen or consulted any other health care providers outside of the [address] [place] [address] since your last visit? Include any pap smears or colon screening.  yes

## 2018-08-07 NOTE — MR AVS SNAPSHOT
[address] [phone] Patient: Xavier Steiner MRN: [id number] Samaria [facility] Visit Information Date & Time Provider Department Dept. Phone Encounter #  
 8/7/2018  4:20 PM Derek Garza MD [address] [place] [address] [id number] Upcoming Health Maintenance Date Due Hepatitis C Screening 1960 DTaP/Tdap/Td series (1 - Tdap) 5/19/1982 PAP AKA CERVICAL CYTOLOGY 5/19/1982 FOBT Q 1 YEAR AGE 50-75 5/19/2011 Influenza Age 5 to Adult 8/1/2018 BREAST CANCER SCRN MAMMOGRAM 4/30/2020 Allergies as of 8/7/2018  Review Complete On: 5/4/2018 By: Beryle Crock Severity Noted Reaction Type Reactions Levaquin [Levofloxacin] Medium 12/01/2014   Systemic Hives Penicillins  11/21/2014    Hives Percocet [Oxycodone-acetaminophen]  11/21/2014    Nausea Only Current Immunizations  Reviewed on 12/4/2014 No immunizations on file. Not reviewed this visit Vitals BP Pulse Temp Resp Height(growth percentile) Weight(growth percentile) 130/87 64 97.3 °F (36.3 °C) 18 5' 2\" (1.575 m) 132 lb (59.9 kg) SpO2 BMI OB Status Smoking Status 99% 24.14 kg/m2 Menopause Never Smoker BMI and BSA Data Body Mass Index Body Surface Area  
 24.14 kg/m 2 1.62 m 2 Preferred Pharmacy Pharmacy Name Phone [id number] [address] [phone] Your Updated Medication List  
  
   
This list is accurate as of 8/7/18  5:02 PM.  Always use your most recent med list.  
  
  
  
  
 aspirin 81 mg chewable tablet Take 81 mg by mouth daily. cetirizine-psuedoePHEDrine 5-120 mg per tablet Commonly known as:  ZyrTEC-D Take 1 Tab by mouth two (2) times a day. CLARITIN 10 mg tablet Generic drug:  loratadine Take 10 mg by mouth as needed for Allergies. FISH -160-1,000 mg Cap Generic drug:  omega 3-dha-epa-fish oil Take  by mouth. MAGNESIUM PO Take  by mouth.  
  
 multivitamin tablet Commonly known as:  ONE A DAY Take 1 Tab by mouth daily. mupirocin 2 % ointment Commonly known as:  BACTROBAN  
APPLY EXTERNALLY AS DIRECTED BY MD  
  
 SUMAtriptan 100 mg tablet Commonly known as:  IMITREX Take 1 Tab by mouth once as needed for Migraine for up to 1 dose. Indications: MIGRAINE Introducing [facility] SERVICES! Dear Page: Thank you for requesting a [employer] account. Our records indicate that you already have an active [employer] account. You can access your account anytime at [email or web address] Did you know that you can access your hospital and ER discharge instructions at any time in [employer]? You can also review all of your test results from your hospital stay or ER visit. Additional Information If you have questions, please visit the Frequently Asked Questions section of the [employer] website at [email or web address] Remember, [employer] is NOT to be used for urgent needs. For medical emergencies, dial 911. Now available from your iPhone and Android! Please provide this summary of care documentation to your next provider. Your primary care clinician is listed as Deidre Morales. If you have any questions after today's visit, please call [phone].

## 2018-08-07 NOTE — PROGRESS NOTES
Sadie Mendoza is a 63 y.o. female who presents with the following:  Chief Complaint   Patient presents with    Medication Refill    Headache    Ear Fullness       Medication Refill   The history is provided by the patient (Patient with history of severe migraine headaches has completely run out of her medicine and had a rather severe attack over the weekend. The nausea and vomiting were bad. ). Associated symptoms include headaches. Pertinent negatives include no chest pain, no abdominal pain and no shortness of breath. Headache   Associated symptoms include headaches. Pertinent negatives include no chest pain, no abdominal pain and no shortness of breath. Ear Fullness    Associated symptoms include headaches and vomiting. Pertinent negatives include no abdominal pain, no cough and no rash. Allergies   Allergen Reactions    Levaquin [Levofloxacin] Hives    Penicillins Hives    Percocet [Oxycodone-Acetaminophen] Nausea Only       Current Outpatient Prescriptions   Medication Sig    SUMAtriptan (IMITREX) 100 mg tablet Take 1 Tab by mouth once as needed for Migraine for up to 1 dose. Indications: Migraine    promethazine (PHENERGAN) 25 mg tablet Take 1 Tab by mouth every six (6) hours as needed for Nausea.  multivitamin (ONE A DAY) tablet Take 1 Tab by mouth daily.  omega 3-dha-epa-fish oil (FISH OIL) 100-160-1,000 mg cap Take  by mouth.  MAGNESIUM PO Take  by mouth.  cetirizine-psuedoePHEDrine (ZYRTEC-D) 5-120 mg per tablet Take 1 Tab by mouth two (2) times a day.  aspirin 81 mg chewable tablet Take 81 mg by mouth daily.  loratadine (CLARITIN) 10 mg tablet Take 10 mg by mouth as needed for Allergies.  mupirocin (BACTROBAN) 2 % ointment APPLY EXTERNALLY AS DIRECTED BY MD     No current facility-administered medications for this visit.         Past Medical History:   Diagnosis Date    Ill-defined condition     MIGRANES    Menopause        Past Surgical History:   Procedure Laterality Date    HX COLECTOMY      Partial w/ rectopexy for prolapse    HX GI  11/20/14    COLONOSCOPY    HX GYN      UTERINE POLYPS       Family History   Problem Relation Age of Onset    Hypertension Mother     Heart Disease Mother      A FIB    Cancer Father      LYMPH, PROSTATE    Cancer Maternal Grandmother      COLON    Cancer Paternal Grandfather      PANCREATIC    Anesth Problems Neg Hx        Social History     Social History    Marital status:      Spouse name: N/A    Number of children: N/A    Years of education: N/A     Social History Main Topics    Smoking status: Never Smoker    Smokeless tobacco: Never Used      Comment: SMOKED VERY BRIEFLY IN COLLEGE    Alcohol use 1.8 oz/week     3 Standard drinks or equivalent per week    Drug use: No    Sexual activity: Not on file     Other Topics Concern    Not on file     Social History Narrative       Review of Systems   Constitutional: Negative for chills, diaphoresis, fever, malaise/fatigue and weight loss. HENT: Negative for congestion. Respiratory: Negative for cough and shortness of breath. Cardiovascular: Negative for chest pain. Gastrointestinal: Positive for nausea and vomiting. Negative for abdominal pain. Skin: Negative for itching and rash. Neurological: Positive for headaches. Negative for dizziness. Visit Vitals    /87    Pulse 64    Temp 97.3 °F (36.3 °C)    Resp 18    Ht 5' 2\" (1.575 m)    Wt 132 lb (59.9 kg)    SpO2 99%    BMI 24.14 kg/m2     Physical Exam   Constitutional: She is oriented to person, place, and time and well-developed, well-nourished, and in no distress. HENT:   Head: Normocephalic and atraumatic. Right Ear: External ear normal.   Left Ear: External ear normal.   Mouth/Throat: Oropharynx is clear and moist.   Eyes: Conjunctivae and EOM are normal. Pupils are equal, round, and reactive to light. Right eye exhibits no discharge. Left eye exhibits no discharge. Neck: Normal range of motion. Neck supple. No tracheal deviation present. No thyromegaly present. Cardiovascular: Normal rate, regular rhythm, normal heart sounds and intact distal pulses. Exam reveals no gallop and no friction rub. No murmur heard. Pulmonary/Chest: Effort normal and breath sounds normal. No respiratory distress. She has no wheezes. She exhibits no tenderness. Abdominal: Soft. Bowel sounds are normal. She exhibits no distension and no mass. There is no tenderness. There is no rebound and no guarding. Musculoskeletal: She exhibits no edema or tenderness. Lymphadenopathy:     She has no cervical adenopathy. Neurological: She is alert and oriented to person, place, and time. She has normal reflexes. No cranial nerve deficit. She exhibits normal muscle tone. Gait normal. Coordination normal.   Skin: Skin is warm and dry. No rash noted. No erythema. No pallor. Psychiatric: Mood, memory, affect and judgment normal.         ICD-10-CM ICD-9-CM    1. History of migraine headaches Z86.69 V12.49 SUMAtriptan (IMITREX) 100 mg tablet      promethazine (PHENERGAN) 25 mg tablet       Orders Placed This Encounter    SUMAtriptan (IMITREX) 100 mg tablet     Sig: Take 1 Tab by mouth once as needed for Migraine for up to 1 dose. Indications: Migraine     Dispense:  18 Tab     Refill:  1    promethazine (PHENERGAN) 25 mg tablet     Sig: Take 1 Tab by mouth every six (6) hours as needed for Nausea.      Dispense:  30 Tab     Refill:  1       Follow-up Disposition: Not on April Neal MD

## 2018-08-21 ENCOUNTER — OFFICE VISIT (OUTPATIENT)
Dept: FAMILY MEDICINE CLINIC | Age: 58
End: 2018-08-21

## 2018-08-21 VITALS
TEMPERATURE: 98.4 F | BODY MASS INDEX: 24.34 KG/M2 | HEART RATE: 66 BPM | HEIGHT: 62 IN | SYSTOLIC BLOOD PRESSURE: 123 MMHG | DIASTOLIC BLOOD PRESSURE: 80 MMHG | WEIGHT: 132.25 LBS | OXYGEN SATURATION: 98 %

## 2018-08-21 DIAGNOSIS — M25.512 ACUTE PAIN OF LEFT SHOULDER: Primary | ICD-10-CM

## 2018-08-21 DIAGNOSIS — M54.9 UPPER BACK PAIN: ICD-10-CM

## 2018-08-21 NOTE — MR AVS SNAPSHOT
[address] [phone] Patient: Laverne Lesches MRN: [id number] Ian Standing Visit Information Date & Time Provider Department Dept. Phone Encounter #  
 8/21/2018  3:00 PM Doug Wright  [address] [facility] CARE [facility] [phone] [id number] Upcoming Health Maintenance Date Due Hepatitis C Screening 1960 DTaP/Tdap/Td series (1 - Tdap) 5/19/1982 PAP AKA CERVICAL CYTOLOGY 5/19/1982 FOBT Q 1 YEAR AGE 50-75 5/19/2011 Influenza Age 5 to Adult 10/17/2018* BREAST CANCER SCRN MAMMOGRAM 4/30/2020 *Topic was postponed. The date shown is not the original due date. Allergies as of 8/21/2018  Review Complete On: 8/21/2018 By: Doug Wright NP Severity Noted Reaction Type Reactions Levaquin [Levofloxacin] Medium 12/01/2014   Systemic Hives Penicillins  11/21/2014    Hives Percocet [Oxycodone-acetaminophen]  11/21/2014    Nausea Only Current Immunizations  Reviewed on 12/4/2014 No immunizations on file. Not reviewed this visit You Were Diagnosed With   
  
 Codes Comments Acute pain of left shoulder    -  Primary ICD-10-CM: M25.512 ICD-9-CM: 719.41 Vitals BP Pulse Temp Height(growth percentile) Weight(growth percentile) SpO2  
 123/80 (BP 1 Location: Left arm, BP Patient Position: Sitting) 66 98.4 °F (36.9 °C) (Oral) 5' 2\" (1.575 m) 132 lb 4 oz (60 kg) 98% BMI OB Status Smoking Status 24.19 kg/m2 Menopause Never Smoker Vitals History BMI and BSA Data Body Mass Index Body Surface Area  
 24.19 kg/m 2 1.62 m 2 Preferred Pharmacy [id number] [address] [phone] Your Updated Medication List  
  
   
This list is accurate as of 8/21/18  3:26 PM.  Always use your most recent med list.  
  
  
  
  
 aspirin 81 mg chewable tablet Take 81 mg by mouth daily. cetirizine-psuedoePHEDrine 5-120 mg per tablet Commonly known as:  ZyrTEC-D Take 1 Tab by mouth two (2) times a day. CLARITIN 10 mg tablet Generic drug:  loratadine Take 10 mg by mouth as needed for Allergies. FISH -160-1,000 mg Cap Generic drug:  omega 3-dha-epa-fish oil Take  by mouth. MAGNESIUM PO Take  by mouth.  
  
 multivitamin tablet Commonly known as:  ONE A DAY Take 1 Tab by mouth daily. mupirocin 2 % ointment Commonly known as:  BACTROBAN  
APPLY EXTERNALLY AS DIRECTED BY MD  
  
 promethazine 25 mg tablet Commonly known as:  PHENERGAN Take 1 Tab by mouth every six (6) hours as needed for Nausea. We Performed the Following AMB POC EKG ROUTINE W/ 12 LEADS, INTER & REP [76338 CPT(R)] Patient Instructions Back Pain: Care Instructions Your Care Instructions Back pain has many possible causes. It is often related to problems with muscles and ligaments of the back. It may also be related to problems with the nerves, discs, or bones of the back. Moving, lifting, standing, sitting, or sleeping in an awkward way can strain the back. Sometimes you don't notice the injury until later. Arthritis is another common cause of back pain. Although it may hurt a lot, back pain usually improves on its own within several weeks. Most people recover in 12 weeks or less. Using good home treatment and being careful not to stress your back can help you feel better sooner. Follow-up care is a key part of your treatment and safety. Be sure to make and go to all appointments, and call your doctor if you are having problems. It's also a good idea to know your test results and keep a list of the medicines you take. How can you care for yourself at home? · Sit or lie in positions that are most comfortable and reduce your pain. Try one of these positions when you lie down: ¨ Lie on your back with your knees bent and supported by large pillows. ¨ Lie on the floor with your legs on the seat of a sofa or chair. Randy Ports on your side with your knees and hips bent and a pillow between your legs. ¨ Lie on your stomach if it does not make pain worse. · Do not sit up in bed, and avoid soft couches and twisted positions. Bed rest can help relieve pain at first, but it delays healing. Avoid bed rest after the first day of back pain. · Change positions every 30 minutes. If you must sit for long periods of time, take breaks from sitting. Get up and walk around, or lie in a comfortable position. · Try using a heating pad on a low or medium setting for 15 to 20 minutes every 2 or 3 hours. Try a warm shower in place of one session with the heating pad. · You can also try an ice pack for 10 to 15 minutes every 2 to 3 hours. Put a thin cloth between the ice pack and your skin. · Take pain medicines exactly as directed. ¨ If the doctor gave you a prescription medicine for pain, take it as prescribed. ¨ If you are not taking a prescription pain medicine, ask your doctor if you can take an over-the-counter medicine. · Take short walks several times a day. You can start with 5 to 10 minutes, 3 or 4 times a day, and work up to longer walks. Walk on level surfaces and avoid hills and stairs until your back is better. · Return to work and other activities as soon as you can. Continued rest without activity is usually not good for your back. · To prevent future back pain, do exercises to stretch and strengthen your back and stomach. Learn how to use good posture, safe lifting techniques, and proper body mechanics. When should you call for help? Call your doctor now or seek immediate medical care if: 
  · You have new or worsening numbness in your legs.  
  · You have new or worsening weakness in your legs. (This could make it hard to stand up.)  
  · You lose control of your bladder or bowels.  Watch closely for changes in your health, and be sure to contact your doctor if: 
  · You have a fever, lose weight, or don't feel well.  
  · You do not get better as expected. Where can you learn more? Go to http://meryl-ricardo.info/. Enter U068 in the search box to learn more about \"Back Pain: Care Instructions. \" Current as of: November 29, 2017 Content Version: 11.7 © 4809-0752 [email or web address]. Care instructions adapted under license by [employer] (which disclaims liability or warranty for this information). If you have questions about a medical condition or this instruction, always ask your healthcare professional. [address] any warranty or liability for your use of this information. Introducing [facility] SERVICES! Dear Page: Thank you for requesting a [employer] account. Our records indicate that you already have an active [employer] account. You can access your account anytime at [email or web address] Did you know that you can access your hospital and ER discharge instructions at any time in [employer]? You can also review all of your test results from your hospital stay or ER visit. Additional Information If you have questions, please visit the Frequently Asked Questions section of the [employer] website at [email or web address] Remember, [employer] is NOT to be used for urgent needs. For medical emergencies, dial 911. Now available from your iPhone and Android! Please provide this summary of care documentation to your next provider. Your primary care clinician is listed as Rhunette Bence. If you have any questions after today's visit, please call [phone].

## 2018-08-21 NOTE — PATIENT INSTRUCTIONS
Back Pain: Care Instructions  Your Care Instructions    Back pain has many possible causes. It is often related to problems with muscles and ligaments of the back. It may also be related to problems with the nerves, discs, or bones of the back. Moving, lifting, standing, sitting, or sleeping in an awkward way can strain the back. Sometimes you don't notice the injury until later. Arthritis is another common cause of back pain. Although it may hurt a lot, back pain usually improves on its own within several weeks. Most people recover in 12 weeks or less. Using good home treatment and being careful not to stress your back can help you feel better sooner. Follow-up care is a key part of your treatment and safety. Be sure to make and go to all appointments, and call your doctor if you are having problems. It's also a good idea to know your test results and keep a list of the medicines you take. How can you care for yourself at home? · Sit or lie in positions that are most comfortable and reduce your pain. Try one of these positions when you lie down:  ¨ Lie on your back with your knees bent and supported by large pillows. ¨ Lie on the floor with your legs on the seat of a sofa or chair. Yadi Sera on your side with your knees and hips bent and a pillow between your legs. ¨ Lie on your stomach if it does not make pain worse. · Do not sit up in bed, and avoid soft couches and twisted positions. Bed rest can help relieve pain at first, but it delays healing. Avoid bed rest after the first day of back pain. · Change positions every 30 minutes. If you must sit for long periods of time, take breaks from sitting. Get up and walk around, or lie in a comfortable position. · Try using a heating pad on a low or medium setting for 15 to 20 minutes every 2 or 3 hours. Try a warm shower in place of one session with the heating pad. · You can also try an ice pack for 10 to 15 minutes every 2 to 3 hours.  Put a thin cloth between the ice pack and your skin. · Take pain medicines exactly as directed. ¨ If the doctor gave you a prescription medicine for pain, take it as prescribed. ¨ If you are not taking a prescription pain medicine, ask your doctor if you can take an over-the-counter medicine. · Take short walks several times a day. You can start with 5 to 10 minutes, 3 or 4 times a day, and work up to longer walks. Walk on level surfaces and avoid hills and stairs until your back is better. · Return to work and other activities as soon as you can. Continued rest without activity is usually not good for your back. · To prevent future back pain, do exercises to stretch and strengthen your back and stomach. Learn how to use good posture, safe lifting techniques, and proper body mechanics. When should you call for help? Call your doctor now or seek immediate medical care if:    · You have new or worsening numbness in your legs.     · You have new or worsening weakness in your legs. (This could make it hard to stand up.)     · You lose control of your bladder or bowels.    Watch closely for changes in your health, and be sure to contact your doctor if:    · You have a fever, lose weight, or don't feel well.     · You do not get better as expected. Where can you learn more? Go to http://meryl-ricardo.info/. Enter D546 in the search box to learn more about \"Back Pain: Care Instructions. \"  Current as of: November 29, 2017  Content Version: 11.7  © 6899-5718 [employer]. Care instructions adapted under license by [employer] (which disclaims liability or warranty for this information). If you have questions about a medical condition or this instruction, always ask your healthcare professional. [place] [address] any warranty or liability for your use of this information.

## 2018-08-21 NOTE — PROGRESS NOTES
Subjective:     Chief Complaint   Patient presents with    Shoulder Pain     Left     Arm Pain     Left     Back Pain     [facility]        Chun Sue is a 63 y.o. female who presents today with concerns about her heart. She was on vacation last week and woke up in the middle of the night with acute pain in her upper back. The pain radiated to the L shoulder and down the L arm. The pain came and went the next day. It was better yesterday and even better today. She was worried because her mom has A-fib and grandma had an aortic aneurism. She has not had any CP, SOB, dizziness, or nausea. Was sweating at the time of the episode but is also going through menopause and has been having nocturnal hot flashes. Has been under a lot of stress lately. No strenuous lifting or activity prior to the back pain. She exercises, does not smoke, and does not have HTN. 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 , Disp: , Rfl:     omega 3-dha-epa-fish oil (FISH OIL) 100-160-1,000 mg cap, Take  by mouth., Disp: , Rfl:     MAGNESIUM PO, Take  by mouth., Disp: , Rfl:     aspirin 81 mg chewable tablet, Take 81 mg by mouth daily. , Disp: , Rfl:     loratadine (CLARITIN) 10 mg tablet, Take 10 mg by mouth as needed for Allergies. , Disp: , Rfl:   cetirizine-psuedoePHEDrine (ZYRTEC-D) 5-120 mg per tablet, Take 1 Tab by mouth two (2) times a day., Disp: 60 Tab, Rfl: 5    Allergies   Allergen Reactions    Levaquin [Levofloxacin] Hives    Penicillins Hives    Percocet [Oxycodone-Acetaminophen] Nausea Only       Past Surgical History:   Procedure Laterality Date    HX COLECTOMY      Partial w/ rectopexy for prolapse    HX GI  11/20/14    COLONOSCOPY    HX GYN      UTERINE POLYPS       History   Smoking Status    Never Smoker   Smokeless Tobacco    Never Used     Comment: SMOKED VERY BRIEFLY IN COLLEGE       Social History     Social History    Marital status:      Spouse name: N/A    Number of children: N/A    Years of education: N/A     Social History Main Topics    Smoking status: Never Smoker    Smokeless tobacco: Never Used      Comment: SMOKED VERY BRIEFLY IN COLLEGE    Alcohol use 1.8 oz/week     3 Standard drinks or equivalent per week    Drug use: No    Sexual activity: Not Asked     Other Topics Concern    None     Social History Narrative       Family History   Problem Relation Age of Onset    Hypertension Mother     Heart Disease Mother      A FIB    Cancer Father      LYMPH, PROSTATE    Cancer Maternal Grandmother      COLON    Cancer Paternal Grandfather      PANCREATIC    Anesth Problems Neg Hx        ROS:  Gen: denies fever, chills, or fatigue  Resp: denies dyspnea, cough, or wheezing  CV: denies chest pain, pressure, or palpitations  Extremeties: denies edema, pallor, or cyanosis  GI[de-identified] denies nausea or vomiting  Musculoskeletal:+ upper bilateral back pain and L shoulder pain, No stiffness or muscle cramps  Neuro: denies numbness/tingling or dizziness  Skin: no diaphoresis   Psych: denies anxiety, depression, anahi, or other changes in mood  Heme: no lymphadenopathy or easy bruising/bleeding    Objective: Body mass index is 24.19 kg/(m^2). General: Alert and oriented. No acute distress.   Well nourished. Lungs: Breathing even and unlabored. All lobes clear to auscultation bilaterally   Heart :RRR, S1 and S2 normal intensity, no extra heart sounds  Extremities: Non-edematous, no pallor or cyanosis. Bilat. radial pulses 2+  Back: No TTP, FROM  Musculoskeletal: L shoulder: No TTP, FROM    Neuro: Cranial nerves grossly normal.  Skin: Warm, dry, and intact. No lesions or discoloration. No results found for this visit on 08/21/18. Assessment/ Plan:     1. Acute upper back pain and L shoulder pain  EKG shows NSR, no acute ST changes or sign of ischemia. Most likely this is musculoskeletal pain that seems to be improving. Pt reassured that she has no risk factors for MI right now. Reviewed s/s of MI. Instructed to call me if she has any other concerning episodes and we can order a cardiac stress test.  F/U prn      No orders of the defined types were placed in this encounter. Verbal and written instructions (see AVS) provided.  Patient expresses understanding of diagnosis and treatment plan. Health Maintenance Due   Topic Date Due    Hepatitis C Screening  1960    DTaP/Tdap/Td series (1 - Tdap) 05/19/1982    PAP AKA CERVICAL CYTOLOGY  05/19/1982    FOBT Q 1 YEAR AGE 50-75  05/19/2011         Follow-up Disposition: Not on File      Yvette Delcid NP

## 2021-01-04 ENCOUNTER — PATIENT MESSAGE (OUTPATIENT)
Dept: FAMILY MEDICINE CLINIC | Age: 61
End: 2021-01-04

## 2021-01-04 DIAGNOSIS — Z86.69 HISTORY OF MIGRAINE HEADACHES: ICD-10-CM

## 2021-01-04 RX ORDER — SUMATRIPTAN 100 MG/1
TABLET, FILM COATED ORAL
Qty: 18 TAB | Refills: 5 | Status: SHIPPED | OUTPATIENT
Start: 2021-01-04

## 2021-01-04 NOTE — TELEPHONE ENCOUNTER
From: Deonte Leigh  To: Sana Mccarthy MD  Sent: 1/4/2021 12:26 PM EST  Subject: Prescription Question    Please have Dr. Katy Bauer call in a 90 day refill for my Sumatriptan (100 mg) to [facility]. [phone] (Not [facility]) Call me if you have any questions. Thank you so much.  Jennifer Moreno [phone]

## 2021-03-05 PROBLEM — M25.839 ULNAR IMPACTION SYNDROME: Status: ACTIVE | Noted: 2021-03-05

## 2021-03-19 DIAGNOSIS — R53.82 CHRONIC FATIGUE: Primary | ICD-10-CM

## 2021-03-19 DIAGNOSIS — M25.542 ARTHRALGIA OF BOTH HANDS: Primary | ICD-10-CM

## 2021-03-19 DIAGNOSIS — R76.8 RHEUMATOID FACTOR POSITIVE: ICD-10-CM

## 2021-03-19 DIAGNOSIS — M25.541 ARTHRALGIA OF BOTH HANDS: Primary | ICD-10-CM

## 2021-03-19 DIAGNOSIS — L98.9 SKIN LESION: ICD-10-CM

## 2021-03-23 ENCOUNTER — DOCUMENTATION ONLY (OUTPATIENT)
Dept: FAMILY MEDICINE CLINIC | Age: 61
End: 2021-03-23

## 2021-03-23 NOTE — PROGRESS NOTES
Manually faxed referral, ov note, labs, xrays, demo and ins card to Dr. Tiffanie Giordano and Dr. Kailyn Giraldo

## 2021-06-08 ENCOUNTER — TRANSCRIBE ORDER (OUTPATIENT)
Dept: SCHEDULING | Age: 61
End: 2021-06-08

## 2021-06-08 DIAGNOSIS — Z78.0 ASYMPTOMATIC MENOPAUSAL STATE: ICD-10-CM

## 2021-06-08 DIAGNOSIS — I77.9 DISORDER OF ARTERIES AND ARTERIOLES, UNSPECIFIED (HCC): ICD-10-CM

## 2021-06-17 ENCOUNTER — HOSPITAL ENCOUNTER (OUTPATIENT)
Dept: ULTRASOUND IMAGING | Age: 61
Discharge: HOME | End: 2021-06-17
Attending: INTERNAL MEDICINE
Payer: COMMERCIAL

## 2021-06-17 ENCOUNTER — HOSPITAL ENCOUNTER (OUTPATIENT)
Dept: GENERAL RADIOLOGY | Age: 61
Discharge: HOME | End: 2021-06-17
Attending: INTERNAL MEDICINE
Payer: COMMERCIAL

## 2021-06-17 ENCOUNTER — HOSPITAL ENCOUNTER (OUTPATIENT)
Dept: MAMMOGRAPHY | Age: 61
Discharge: HOME | End: 2021-06-17
Attending: INTERNAL MEDICINE
Payer: COMMERCIAL

## 2021-06-17 DIAGNOSIS — Z78.0 ASYMPTOMATIC MENOPAUSAL STATE: ICD-10-CM

## 2021-06-17 DIAGNOSIS — I77.9 DISORDER OF ARTERIES AND ARTERIOLES, UNSPECIFIED (HCC): ICD-10-CM

## 2021-06-17 DIAGNOSIS — Z12.31 VISIT FOR SCREENING MAMMOGRAM: ICD-10-CM

## 2021-06-17 LAB
LEFT CCA DIST DIAS: 19.5 CM/S
LEFT CCA DIST SYS: 68.8 CM/S
LEFT CCA PROX DIAS: 17.5 CM/S
LEFT CCA PROX SYS: 70.8 CM/S
LEFT ECA DIAS: 9.65 CM/S
LEFT ECA SYS: 62.9 CM/S
LEFT ICA DIST DIAS: 23.1 CM/S
LEFT ICA DIST SYS: 62.3 CM/S
LEFT ICA MID DIAS: 20 CM/S
LEFT ICA MID SYS: 51.3 CM/S
LEFT ICA PROX DIAS: 17.5 CM/S
LEFT ICA PROX SYS: 47.1 CM/S
LEFT ICA/CCA SYS: 0.91
LEFT VERTEBRAL DIAS: 9.65 CM/S
LEFT VERTEBRAL SYS: 37.2 CM/S
RIGHT CCA DIST DIAS: 19.4 CM/S
RIGHT CCA DIST SYS: 67.7 CM/S
RIGHT CCA PROX DIAS: 20.7 CM/S
RIGHT CCA PROX SYS: 65.1 CM/S
RIGHT ECA DIAS: 12.91 CM/S
RIGHT ECA SYS: 97.7 CM/S
RIGHT ICA DIST DIAS: 20.7 CM/S
RIGHT ICA DIST SYS: 48.1 CM/S
RIGHT ICA MID DIAS: 19.4 CM/S
RIGHT ICA MID SYS: 63.8 CM/S
RIGHT ICA PROX DIAS: 19.4 CM/S
RIGHT ICA PROX SYS: 54.6 CM/S
RIGHT ICA/CCA SYS: 0.9
RIGHT VERTEBRAL DIAS: 18.13 CM/S
RIGHT VERTEBRAL SYS: 58.6 CM/S

## 2021-06-17 PROCEDURE — 93880 EXTRACRANIAL BILAT STUDY: CPT

## 2021-06-17 PROCEDURE — 77080 DXA BONE DENSITY AXIAL: CPT

## 2021-06-17 PROCEDURE — 77063 BREAST TOMOSYNTHESIS BI: CPT

## 2022-02-07 ENCOUNTER — TRANSCRIBE ORDER (OUTPATIENT)
Dept: REGISTRATION | Age: 62
End: 2022-02-07

## 2022-02-07 ENCOUNTER — HOSPITAL ENCOUNTER (OUTPATIENT)
Dept: GENERAL RADIOLOGY | Age: 62
Discharge: HOME | End: 2022-02-07
Payer: COMMERCIAL

## 2022-02-07 DIAGNOSIS — M79.644 PAIN OF RIGHT THUMB: ICD-10-CM

## 2022-02-07 DIAGNOSIS — M79.644 PAIN OF RIGHT THUMB: Primary | ICD-10-CM

## 2022-02-07 PROCEDURE — 73140 X-RAY EXAM OF FINGER(S): CPT

## 2022-03-18 PROBLEM — Z86.69 HISTORY OF MIGRAINE HEADACHES: Status: ACTIVE | Noted: 2017-02-27

## 2022-03-18 PROBLEM — R53.82 CHRONIC FATIGUE: Status: ACTIVE | Noted: 2017-09-29

## 2022-03-19 PROBLEM — J34.81 ULCERATIVE NASAL MUCOSITIS: Status: ACTIVE | Noted: 2017-03-23

## 2022-03-19 PROBLEM — Z79.899 ENCOUNTER FOR LONG-TERM (CURRENT) USE OF MEDICATIONS: Status: ACTIVE | Noted: 2017-09-29

## 2022-03-19 PROBLEM — E01.0 THYROMEGALY: Status: ACTIVE | Noted: 2017-09-29

## 2022-03-19 PROBLEM — M25.839 ULNAR IMPACTION SYNDROME: Status: ACTIVE | Noted: 2021-03-05

## 2022-03-20 PROBLEM — J31.0 NONALLERGIC RHINITIS: Status: ACTIVE | Noted: 2017-08-01

## 2022-03-20 PROBLEM — K58.0 IRRITABLE BOWEL SYNDROME WITH DIARRHEA: Status: ACTIVE | Noted: 2017-02-27

## 2022-06-21 ENCOUNTER — HOSPITAL ENCOUNTER (OUTPATIENT)
Dept: MAMMOGRAPHY | Age: 62
Discharge: HOME | End: 2022-06-21
Attending: NURSE PRACTITIONER
Payer: COMMERCIAL

## 2022-06-21 VITALS — WEIGHT: 132 LBS | BODY MASS INDEX: 24.14 KG/M2

## 2022-06-21 DIAGNOSIS — Z12.31 VISIT FOR SCREENING MAMMOGRAM: ICD-10-CM

## 2022-06-21 PROCEDURE — 77063 BREAST TOMOSYNTHESIS BI: CPT

## 2023-02-22 ENCOUNTER — TRANSCRIBE ORDER (OUTPATIENT)
Dept: SCHEDULING | Age: 63
End: 2023-02-22

## 2023-02-22 DIAGNOSIS — Z80.3 FAMILY HISTORY OF MALIGNANT NEOPLASM OF BREAST: Primary | ICD-10-CM

## 2023-04-22 DIAGNOSIS — Z78.0 MENOPAUSE: Primary | ICD-10-CM

## 2023-04-22 DIAGNOSIS — Z12.31 ENCOUNTER FOR SCREENING MAMMOGRAM FOR MALIGNANT NEOPLASM OF BREAST: Primary | ICD-10-CM

## 2023-10-19 ENCOUNTER — TELEPHONE (OUTPATIENT)
Facility: HOSPITAL | Age: 63
End: 2023-10-19

## 2023-10-26 ENCOUNTER — HOSPITAL ENCOUNTER (OUTPATIENT)
Facility: HOSPITAL | Age: 63
Discharge: HOME | End: 2023-10-28
Payer: COMMERCIAL

## 2023-10-26 DIAGNOSIS — R06.02 SHORTNESS OF BREATH: ICD-10-CM

## 2023-10-26 LAB
STRESS BASELINE DIAS BP: 62 MMHG
STRESS BASELINE HR: 68 BPM
STRESS BASELINE ST DEPRESSION: 0 MM
STRESS BASELINE SYS BP: 100 MMHG
STRESS ESTIMATED WORKLOAD: 7.8 METS
STRESS EXERCISE DUR MIN: 6 MIN
STRESS EXERCISE DUR SEC: 17 SEC
STRESS PEAK DIAS BP: 96 MMHG
STRESS PEAK SYS BP: 201 MMHG
STRESS PERCENT HR ACHIEVED: 96 %
STRESS POST PEAK HR: 151 BPM
STRESS RATE PRESSURE PRODUCT: NORMAL BPM*MMHG
STRESS STAGE 1 DURATION: 3 MIN:SEC
STRESS STAGE 1 HR: 117 BPM
STRESS STAGE 2 BP: NORMAL MMHG
STRESS STAGE 2 DURATION: 3 MIN:SEC
STRESS STAGE 2 HR: 146 BPM
STRESS STAGE 3 DURATION: NORMAL MIN:SEC
STRESS STAGE 3 HR: 151 BPM
STRESS STAGE RECOVERY 1 BP: NORMAL MMHG
STRESS STAGE RECOVERY 1 DURATION: NORMAL MIN:SEC
STRESS STAGE RECOVERY 1 HR: 71 BPM
STRESS STAGE RECOVERY 2 BP: NORMAL MMHG
STRESS TARGET HR: 158 BPM

## 2023-10-26 PROCEDURE — 93018 CV STRESS TEST I&R ONLY: CPT | Performed by: INTERNAL MEDICINE

## 2023-10-26 PROCEDURE — 93017 CV STRESS TEST TRACING ONLY: CPT

## 2023-10-26 PROCEDURE — 93016 CV STRESS TEST SUPVJ ONLY: CPT | Performed by: INTERNAL MEDICINE

## 2025-04-29 ENCOUNTER — APPOINTMENT (OUTPATIENT)
Facility: HOSPITAL | Age: 65
End: 2025-04-29
Payer: COMMERCIAL

## 2025-04-29 ENCOUNTER — HOSPITAL ENCOUNTER (EMERGENCY)
Facility: HOSPITAL | Age: 65
Discharge: HOME | End: 2025-04-29
Attending: EMERGENCY MEDICINE
Payer: COMMERCIAL

## 2025-04-29 VITALS
HEIGHT: 63 IN | RESPIRATION RATE: 17 BRPM | WEIGHT: 134 LBS | OXYGEN SATURATION: 97 % | DIASTOLIC BLOOD PRESSURE: 94 MMHG | SYSTOLIC BLOOD PRESSURE: 131 MMHG | HEART RATE: 71 BPM | BODY MASS INDEX: 23.74 KG/M2

## 2025-04-29 DIAGNOSIS — S00.81XA ABRASION OF FACE, INITIAL ENCOUNTER: ICD-10-CM

## 2025-04-29 DIAGNOSIS — S09.90XA INJURY OF HEAD, INITIAL ENCOUNTER: Primary | ICD-10-CM

## 2025-04-29 PROCEDURE — 70486 CT MAXILLOFACIAL W/O DYE: CPT

## 2025-04-29 PROCEDURE — 99284 EMERGENCY DEPT VISIT MOD MDM: CPT

## 2025-04-29 PROCEDURE — 70450 CT HEAD/BRAIN W/O DYE: CPT

## 2025-04-29 PROCEDURE — 72125 CT NECK SPINE W/O DYE: CPT

## 2025-04-29 ASSESSMENT — LIFESTYLE VARIABLES
HOW MANY STANDARD DRINKS CONTAINING ALCOHOL DO YOU HAVE ON A TYPICAL DAY: PATIENT DOES NOT DRINK
HOW OFTEN DO YOU HAVE A DRINK CONTAINING ALCOHOL: NEVER

## 2025-04-29 ASSESSMENT — PAIN - FUNCTIONAL ASSESSMENT: PAIN_FUNCTIONAL_ASSESSMENT: 0-10

## 2025-04-29 ASSESSMENT — PAIN SCALES - GENERAL: PAINLEVEL_OUTOF10: 8

## 2025-04-30 NOTE — ED PROVIDER NOTES
[facility] EMERGENCY DEPARTMENT  EMERGENCY DEPARTMENT ENCOUNTER       Pt Name: Mayela Baum  MRN: [id number]  Birthdate 1960  Date of evaluation: 4/29/2025  Provider: Shauna Aaron MD   PCP: Adele Rodriguez, CHARLES - NP  Note Started: 8:33 AM EDT 4/30/25     CHIEF COMPLAINT       Chief Complaint   Patient presents with    Fall        HISTORY OF PRESENT ILLNESS: 1 or more elements      History From: Patient, History limited by: none     Mayela Baum is a 64 y.o. female presents to ED complaining of fall.  Patient reports she fell hitting her right face on the ground.  Injury occurred yesterday.  No LOC.       Please See MDM for Additional Details of the HPI/PMH  Nursing Notes were all reviewed and agreed with or any disagreements were addressed in the HPI.     REVIEW OF SYSTEMS        Positives and Pertinent negatives as per HPI.    PAST HISTORY     Past Medical History:  Past Medical History:   Diagnosis Date    Ill-defined condition     MIGRANES    Menopause        Past Surgical History:  Past Surgical History:   Procedure Laterality Date    GI  11/20/14    COLONOSCOPY    GYN      UTERINE POLYPS    TOTAL COLECTOMY      Partial w/ rectopexy for prolapse       Family History:  Family History   Problem Relation Age of Onset    Anesth Problems Neg Hx     Cancer Paternal Grandfather         PANCREATIC    Cancer Maternal Grandmother         COLON    Cancer Father         LYMPH, PROSTATE    Breast Cancer Mother         dx age 83    Heart Disease Mother         A FIB    Hypertension Mother        Social History:  Social History     Tobacco Use    Smoking status: Never    Smokeless tobacco: Never    Tobacco comments:     Quit smoking: SMOKED VERY BRIEFLY IN COLLEGE   Substance Use Topics    Alcohol use: Yes     Alcohol/week: 3.0 standard drinks of alcohol    Drug use: No       CURRENT MEDICATIONS      Discharge Medication List as of 4/29/2025  5:27 PM        CONTINUE these medications which have

## 2025-09-04 ENCOUNTER — OFFICE VISIT (OUTPATIENT)
Age: 65
End: 2025-09-04
Payer: COMMERCIAL

## 2025-09-04 VITALS
WEIGHT: 130 LBS | BODY MASS INDEX: 23.04 KG/M2 | RESPIRATION RATE: 12 BRPM | SYSTOLIC BLOOD PRESSURE: 132 MMHG | HEIGHT: 63 IN | TEMPERATURE: 98.1 F | DIASTOLIC BLOOD PRESSURE: 78 MMHG | HEART RATE: 65 BPM | OXYGEN SATURATION: 98 %

## 2025-09-04 DIAGNOSIS — K62.3 RECTAL PROLAPSE: Primary | ICD-10-CM

## 2025-09-04 DIAGNOSIS — Z12.11 COLON CANCER SCREENING: ICD-10-CM

## 2025-09-04 PROCEDURE — 99203 OFFICE O/P NEW LOW 30 MIN: CPT | Performed by: SURGERY

## 2025-09-04 ASSESSMENT — PATIENT HEALTH QUESTIONNAIRE - PHQ9
SUM OF ALL RESPONSES TO PHQ QUESTIONS 1-9: 0
1. LITTLE INTEREST OR PLEASURE IN DOING THINGS: NOT AT ALL
SUM OF ALL RESPONSES TO PHQ QUESTIONS 1-9: 0
2. FEELING DOWN, DEPRESSED OR HOPELESS: NOT AT ALL